# Patient Record
Sex: FEMALE | Race: WHITE | URBAN - METROPOLITAN AREA
[De-identification: names, ages, dates, MRNs, and addresses within clinical notes are randomized per-mention and may not be internally consistent; named-entity substitution may affect disease eponyms.]

---

## 2017-02-09 RX ORDER — ERYTHROMYCIN 5 MG/G
1 OINTMENT OPHTHALMIC 4 TIMES DAILY
Status: ON HOLD | COMMUNITY
End: 2017-02-13

## 2017-02-13 ENCOUNTER — ANESTHESIA (OUTPATIENT)
Dept: SURGERY | Facility: CLINIC | Age: 82
End: 2017-02-13
Payer: MEDICARE

## 2017-02-13 ENCOUNTER — ANESTHESIA EVENT (OUTPATIENT)
Dept: SURGERY | Facility: CLINIC | Age: 82
End: 2017-02-13
Payer: MEDICARE

## 2017-02-13 ENCOUNTER — HOSPITAL ENCOUNTER (OUTPATIENT)
Facility: CLINIC | Age: 82
Discharge: HOME | End: 2017-02-13
Attending: OPHTHALMOLOGY | Admitting: OPHTHALMOLOGY
Payer: MEDICARE

## 2017-02-13 VITALS
SYSTOLIC BLOOD PRESSURE: 144 MMHG | HEART RATE: 73 BPM | TEMPERATURE: 96.3 F | OXYGEN SATURATION: 96 % | RESPIRATION RATE: 16 BRPM | DIASTOLIC BLOOD PRESSURE: 77 MMHG | WEIGHT: 135 LBS | BODY MASS INDEX: 21.69 KG/M2 | HEIGHT: 66 IN

## 2017-02-13 DIAGNOSIS — H40.1122 PRIMARY OPEN ANGLE GLAUCOMA OF LEFT EYE, MODERATE STAGE: Primary | ICD-10-CM

## 2017-02-13 PROCEDURE — 40000170 ZZH STATISTIC PRE-PROCEDURE ASSESSMENT II: Performed by: OPHTHALMOLOGY

## 2017-02-13 PROCEDURE — 37000009 ZZH ANESTHESIA TECHNICAL FEE, EACH ADDTL 15 MIN: Performed by: OPHTHALMOLOGY

## 2017-02-13 PROCEDURE — 25000128 H RX IP 250 OP 636: Performed by: NURSE ANESTHETIST, CERTIFIED REGISTERED

## 2017-02-13 PROCEDURE — 25000125 ZZHC RX 250: Performed by: OPHTHALMOLOGY

## 2017-02-13 PROCEDURE — 25800025 ZZH RX 258: Performed by: ANESTHESIOLOGY

## 2017-02-13 PROCEDURE — 25000125 ZZHC RX 250: Performed by: NURSE ANESTHETIST, CERTIFIED REGISTERED

## 2017-02-13 PROCEDURE — 25800025 ZZH RX 258: Performed by: OPHTHALMOLOGY

## 2017-02-13 PROCEDURE — 37000008 ZZH ANESTHESIA TECHNICAL FEE, 1ST 30 MIN: Performed by: OPHTHALMOLOGY

## 2017-02-13 PROCEDURE — 71000028 ZZH EYE RECOVERY PHASE 2 EACH 15 MINS: Performed by: OPHTHALMOLOGY

## 2017-02-13 PROCEDURE — 25000125 ZZHC RX 250: Performed by: ANESTHESIOLOGY

## 2017-02-13 PROCEDURE — 27210794 ZZH OR GENERAL SUPPLY STERILE: Performed by: OPHTHALMOLOGY

## 2017-02-13 PROCEDURE — 36000105 ZZH EYE SURGERY LEVEL 4 EA 15 ADDTL MIN: Performed by: OPHTHALMOLOGY

## 2017-02-13 PROCEDURE — A9270 NON-COVERED ITEM OR SERVICE: HCPCS | Mod: GY | Performed by: OPHTHALMOLOGY

## 2017-02-13 PROCEDURE — C1783 OCULAR IMP, AQUEOUS DRAIN DE: HCPCS | Performed by: OPHTHALMOLOGY

## 2017-02-13 PROCEDURE — 27210995 ZZH RX 272: Performed by: OPHTHALMOLOGY

## 2017-02-13 PROCEDURE — 25000132 ZZH RX MED GY IP 250 OP 250 PS 637: Mod: GY | Performed by: OPHTHALMOLOGY

## 2017-02-13 PROCEDURE — 36000104 ZZH EYE SURGERY LEVEL 4 1ST 30 MIN: Performed by: OPHTHALMOLOGY

## 2017-02-13 PROCEDURE — V2790 AMNIOTIC MEMBRANE: HCPCS | Performed by: OPHTHALMOLOGY

## 2017-02-13 DEVICE — EYE IMP AMNIOTIC MEMBRANE 2.0X3.0CM AMBIO 5 AF-1230: Type: IMPLANTABLE DEVICE | Site: EYE | Status: FUNCTIONAL

## 2017-02-13 DEVICE — EYE SHUNT EX-PRESS P MINI GLAUCOMA 50UM P-50PL: Type: IMPLANTABLE DEVICE | Site: EYE | Status: FUNCTIONAL

## 2017-02-13 DEVICE — IMP EYE OLOGEN COLLAGEN MATRIX AEON 12MMX1MM 862051: Type: IMPLANTABLE DEVICE | Site: EYE | Status: FUNCTIONAL

## 2017-02-13 RX ORDER — ACETAMINOPHEN 325 MG/1
650 TABLET ORAL ONCE
Status: COMPLETED | OUTPATIENT
Start: 2017-02-13 | End: 2017-02-13

## 2017-02-13 RX ORDER — KETOROLAC TROMETHAMINE 30 MG/ML
INJECTION, SOLUTION INTRAMUSCULAR; INTRAVENOUS PRN
Status: DISCONTINUED | OUTPATIENT
Start: 2017-02-13 | End: 2017-02-13

## 2017-02-13 RX ORDER — SODIUM CHLORIDE, SODIUM LACTATE, POTASSIUM CHLORIDE, CALCIUM CHLORIDE 600; 310; 30; 20 MG/100ML; MG/100ML; MG/100ML; MG/100ML
INJECTION, SOLUTION INTRAVENOUS CONTINUOUS
Status: DISCONTINUED | OUTPATIENT
Start: 2017-02-13 | End: 2017-02-13 | Stop reason: HOSPADM

## 2017-02-13 RX ORDER — ERYTHROMYCIN 5 MG/G
OINTMENT OPHTHALMIC PRN
Status: DISCONTINUED | OUTPATIENT
Start: 2017-02-13 | End: 2017-02-13 | Stop reason: HOSPADM

## 2017-02-13 RX ORDER — ATROPINE SULFATE 10 MG/ML
SOLUTION/ DROPS OPHTHALMIC PRN
Status: DISCONTINUED | OUTPATIENT
Start: 2017-02-13 | End: 2017-02-13 | Stop reason: HOSPADM

## 2017-02-13 RX ORDER — MOXIFLOXACIN 5 MG/ML
1 SOLUTION/ DROPS OPHTHALMIC 4 TIMES DAILY
Qty: 3 ML | Refills: 0 | Status: SHIPPED | OUTPATIENT
Start: 2017-02-13 | End: 2017-02-28

## 2017-02-13 RX ORDER — ONDANSETRON 2 MG/ML
INJECTION INTRAMUSCULAR; INTRAVENOUS PRN
Status: DISCONTINUED | OUTPATIENT
Start: 2017-02-13 | End: 2017-02-13

## 2017-02-13 RX ORDER — DEXAMETHASONE SODIUM PHOSPHATE 10 MG/ML
INJECTION, SOLUTION INTRAMUSCULAR; INTRAVENOUS PRN
Status: DISCONTINUED | OUTPATIENT
Start: 2017-02-13 | End: 2017-02-13 | Stop reason: HOSPADM

## 2017-02-13 RX ORDER — SODIUM CHLORIDE, SODIUM LACTATE, POTASSIUM CHLORIDE, CALCIUM CHLORIDE 600; 310; 30; 20 MG/100ML; MG/100ML; MG/100ML; MG/100ML
1000 INJECTION, SOLUTION INTRAVENOUS CONTINUOUS
Status: DISCONTINUED | OUTPATIENT
Start: 2017-02-13 | End: 2017-02-13 | Stop reason: HOSPADM

## 2017-02-13 RX ORDER — FENTANYL CITRATE 50 UG/ML
INJECTION, SOLUTION INTRAMUSCULAR; INTRAVENOUS PRN
Status: DISCONTINUED | OUTPATIENT
Start: 2017-02-13 | End: 2017-02-13

## 2017-02-13 RX ORDER — PREDNISOLONE ACETATE 10 MG/ML
1 SUSPENSION/ DROPS OPHTHALMIC 4 TIMES DAILY
Qty: 6 ML | Refills: 1 | Status: SHIPPED | OUTPATIENT
Start: 2017-02-13 | End: 2017-04-14

## 2017-02-13 RX ORDER — BALANCED SALT SOLUTION 6.4; .75; .48; .3; 3.9; 1.7 MG/ML; MG/ML; MG/ML; MG/ML; MG/ML; MG/ML
SOLUTION OPHTHALMIC PRN
Status: DISCONTINUED | OUTPATIENT
Start: 2017-02-13 | End: 2017-02-13 | Stop reason: HOSPADM

## 2017-02-13 RX ADMIN — DEXMEDETOMIDINE 10 MCG: 100 INJECTION, SOLUTION, CONCENTRATE INTRAVENOUS at 14:07

## 2017-02-13 RX ADMIN — LIDOCAINE HYDROCHLORIDE 0.3 ML: 10 INJECTION, SOLUTION EPIDURAL; INFILTRATION; INTRACAUDAL; PERINEURAL at 12:24

## 2017-02-13 RX ADMIN — Medication: at 13:23

## 2017-02-13 RX ADMIN — KETOROLAC TROMETHAMINE 15 MG: 30 INJECTION, SOLUTION INTRAMUSCULAR at 15:02

## 2017-02-13 RX ADMIN — DEXMEDETOMIDINE 10 MCG: 100 INJECTION, SOLUTION, CONCENTRATE INTRAVENOUS at 14:16

## 2017-02-13 RX ADMIN — FENTANYL CITRATE 50 MCG: 50 INJECTION, SOLUTION INTRAMUSCULAR; INTRAVENOUS at 14:05

## 2017-02-13 RX ADMIN — ONDANSETRON 4 MG: 2 INJECTION INTRAMUSCULAR; INTRAVENOUS at 14:15

## 2017-02-13 RX ADMIN — ACETAMINOPHEN 650 MG: 325 TABLET ORAL at 15:15

## 2017-02-13 RX ADMIN — SODIUM CHLORIDE, POTASSIUM CHLORIDE, SODIUM LACTATE AND CALCIUM CHLORIDE: 600; 310; 30; 20 INJECTION, SOLUTION INTRAVENOUS at 12:24

## 2017-02-13 RX ADMIN — KETOROLAC TROMETHAMINE 15 MG: 30 INJECTION, SOLUTION INTRAMUSCULAR at 14:15

## 2017-02-13 RX ADMIN — FENTANYL CITRATE 50 MCG: 50 INJECTION, SOLUTION INTRAMUSCULAR; INTRAVENOUS at 14:15

## 2017-02-13 ASSESSMENT — ENCOUNTER SYMPTOMS: ORTHOPNEA: 0

## 2017-02-13 NOTE — OP NOTE
Patient:      Margaux Villasenor                                               Reg No:     [id number]                                               Date:          2017                                               :          1931                                                  Attending:   GABRIELLE GEIGER M.D.                                                Surgeon:     GABRIELLE GEIGER M.D.                                                Dictating:    GABRIELLE GEIGER M.D.                                                Service Dt: Ophthalmology                                      OPERATIVE REPORT          ANESTHESIA:   Monitored anesthetic care with local and subTenon's block of mixture of two mls of lidocaine 2% and two mls of Marcaine 0.75% injected into inferior-nasal subTenon s space.      PREOPERATIVE DIAGNOSIS (ES):   OAG, left eye     POSTOPERATIVE DIAGNOSIS (ES):   The same     NAME OF OPERATION:   1. Express shunt, model P-50, serial number [id number], with Ologen and amniotic membrane, Ambio 5, [id number], left eye     COMPLICATIONS:  None.      SPECIMENS REMOVED:   None.     INDICATIONS FOR PROCEDURE:  The patient is a 85 year old  female with unsatisfactory controlled intraocular pressure on maximum medical treatment and progressive visual field loss. The benefits, alternatives and risks of the procedure including the risk of infection, inflammation, bleeding, blindness, need for another procedure or additional glaucoma medications, hypotony, elevated intraocular pressure were discussed with the patient. The patient understood the benefits, alternatives and risks associated with the procedure and has elected to proceed with the surgery.     DESCRIPTION OF PROCEDURE:     After the appropriate pre-operative consent was obtained the patient was taken to the operating room and hooked up to cardiorespiratory monitoring by the anesthesia team.   Monitored anesthetic care was provided by the anesthesia team. The time out was taken to identify the patient, the surgery, the implant and the eye. The patient was draped in usual sterile ophthalmic fashion. Tetracaine drops were instilled into the eye. Lid speculum was placed into the eye. SubTenon injection with a mixture of lidocaine and Marcaine was given into inferior-nasal subtenon s space.      6-0 silk traction suture was placed into superior cornea and the eye was infraducted. The 6-0 silk suture was secured to the sterile drape with hemostat. Conjunctival peritomy was performed utilizing straight smooth forceps and Trevon's scissors 10 millimeter superior to the limbus. The conjunctiva and Tenon s were dissected towards limbus. Cautery was used to archive hemostasis.  [place] blade was used to create a partial-thickness scleral flap at the limbus. Nasal paracentesis was performed with 25 gauge needle on a TB syringe. The AC was filled with healon. 25 gauge needle on a TB syringe was used to create an entry for the Ex-PRESS shunt in to the AC. The Ex-PRESS shunt was inserted into the AC. The flap was re-positioned and secured to the sclera with 4 10-0 nylon sutures, one at each corner. The AC was re-filled with healon GV. Ologen was placed on top of the scleral flap. Amnio was placed on top of the ologen to prevent tissue thinning and bleb leak. Conjunctiva and Tenon s were closed with 8-0 vycryl. Fluorescein strip wetted with balanced salt solution was used to paint conjunctival incision to identify any leak. Following that fluorescein was washed off with balanced salt solution.   At the end of the surgery the AC was deep, the bleb was forming and no leak was identified. Vancomycin and dexamethazone injections were given in sub-Tenon's space. The lid speculum was removed. Maxitrol ointment waw placed in the eye. The eye was patched and shielded in standard ophthalmic fashion.     DISPOSITION: The  patient was taken to the recovery room in stable condition having tolerated the procedure well. He will be given post-operative instructions and seen in the eye clinic tomorrow.    SPONGE/INSTRUMENT/NEEDLE COUNTS:   All sponge and needle counts were correct at the end of the case.     ____________________________   Taylor GEIGER M.D.

## 2017-02-13 NOTE — IP AVS SNAPSHOT
MRN:[id number]                      After Visit Summary   2/13/2017    Margaux Villasenor    MRN: [id number]           Thank you!     Thank you for choosing [facility] for your care. Our goal is always to provide you with excellent care. Hearing back from our patients is one way we can continue to improve our services. Please take a few minutes to complete the written survey that you may receive in the mail after you visit with us. Thank you!        Patient Information     Date Of Birth          7/2/1931        About your hospital stay     You were admitted on:  February 13, 2017 You last received care in the:  [facility]    You were discharged on:  February 13, 2017       Who to Call     For medical emergencies, please call 911.  For non-urgent questions about your medical care, please call your primary care provider or clinic, [phone]  For questions related to your surgery, please call your surgery clinic        Attending Provider     Provider Taylor Garcia MD Ophthalmology       Primary Care Provider Office Phone # Fax #    Melinda Jackson [phone]       TIARA MANNING [address]  [facility] [address]        After Care Instructions     Eye drops as prescribed by physician.  Start drops today unless told otherwise by the physician           May use Tylenol or Advil for pain as directed by the physician           Notify Physician if you have severe headache or nausea           Remove patch per physician instruction           Return to clinic as instructed by physician           Wear eye shield or patch as directed                 Further instructions from your care team       [facility] Discharge  Instructions  Anesthesia (Eye Care Center)   Adult Discharge Instructions    For 24 hours after surgery    1. Get plenty of rest.  Make arrangements to have a responsible adult stay with you  for at least 6 hours after you leave the hospital.  2. Do not drive or use heavy equipment for 24 hours.    3. Do not drink alcohol for 24 hours.  4. Do not sign legal documents or make important decisions for 24 hours.  5. Avoid strenuous or risky activities. You may feel lightheaded.  If so, sit for a few minutes before standing.  Have someone help you get up.   6. Conscious sedation patients may resume a regular diet..  7. Any questions of medical nature, call your physician.        Discharge Instructions  Post-Operative Glaucoma Surgery  Dr. Chan  [phone]    Pain Management:      Some mild discomfort is normal following surgery.  If you are currently taking any medications for pain, you may continue to take what you normally do.  Otherwise, you may take an over-the-counter medication such as Tylenol (acetaminophen) or ibuprofen (Advil) for relief.  Take as instructed on the bottle.    If you are experiencing uncontrollable pain or have other concerns, call [phone].    Other Medications:    No eye drops to the operative eye tonight.  Please remember to bring all your drops/supplies with you to your post-op appointment.  You will be instructed on the drops at that time.    Do not take glaucoma pills.    You may resume your regular home medications, including any drops you are using in your NON-operative eye.      General Rules:    Avoid strenuous activity. Do not do anything that would cause you to strain or make your face red. Open your mouth if you cough, use a laxative if necessary, do not lift greater than 5 pounds.    Keep your head above your heart.  Do not bend down lower than waist level.  Bend with your knees.    Try to sleep in a recliner chair tonight OR on a least two pillows. DO NOT LAY FLAT.    Keep the bandage over your eye.  The doctor will remove the bandage at your appointment at the clinic.    Try to write down any questions you may have to bring to your post-op appointment.    Be  "restful today.    Pending Results     No orders found from 2017 to 2017.            Admission Information     Date & Time Provider Department Dept. Phone    2017 Taylor Chan MD [facility] [phone]      Your Vitals Were     Blood Pressure Pulse Temperature Respirations Height Weight    168/80 73 96.3  F (35.7  C) (Temporal) 16 1.676 m (5' 6\") 61.2 kg (135 lb)    Pulse Oximetry BMI (Body Mass Index)                94% 21.79 kg/m2          MyChart Information     [employer] lets you send messages to your doctor, view your test results, renew your prescriptions, schedule appointments and more. To sign up, go to [email or web address] . Click on \"Log in\" on the left side of the screen, which will take you to the Welcome page. Then click on \"Sign up Now\" on the right side of the page.     You will be asked to enter the access code listed below, as well as some personal information. Please follow the directions to create your username and password.     Your access code is: [email or web address]  Expires: 2017  3:03 PM     Your access code will  in 90 days. If you need help or a new code, please call your [facility] clinic or [phone].        Care EveryWhere ID     This is your Care EveryWhere ID. This could be used by other organizations to access your [facility] medical records  [id number]           Review of your medicines      UNREVIEWED medicines. Ask your doctor about these medicines        Dose / Directions    ALIGN PO        Take by mouth daily   Refills:  0       B COMPLEX PO        Dose:  1 tablet   Take 1 tablet by mouth daily.   Refills:  0       CALCIUM 600 PO        Dose:  600 tablet   Take 600 tablets by mouth daily Take 600 mg every am, 300 mg at lunch, 600 mg in pm   Refills:  0       cholestyramine 4 G Packet   Commonly known as:  QUESTRAN        Dose:  1-2 packet   Take 1-2 packets by mouth once   Refills:  0       conjugated estrogens cream   Commonly " known as:  PREMARIN        Place vaginally At Bedtime   Refills:  0       COREG PO        Dose:  12.5 mg   Take 12.5 mg by mouth 2 times daily (with meals) Hold am dose for SBP <120   Refills:  0       ECOTRIN PO        Dose:  81 mg   Take 81 mg by mouth One every other day   Refills:  0       hyoscyamine 0.125 MG sublingual tablet   Commonly known as:  LEVSIN/SL        Dose:  0.125 mg   Place 0.125 mg under the tongue every 4 hours as needed for cramping   Refills:  0       OMEPRAZOLE PO        Dose:  20 mg   Take 20 mg by mouth every morning   Refills:  0       ZIOPTAN 0.0015 % Soln   Generic drug:  Tafluprost        Dose:  1 drop   Place 1 drop Into the left eye At Bedtime   Refills:  0       zolpidem 5 MG tablet   Commonly known as:  AMBIEN        Dose:  5 mg   Take 5 mg by mouth nightly as needed.   Refills:  0         START taking        Dose / Directions    moxifloxacin 0.5 % ophthalmic solution   Commonly known as:  VIGAMOX   Used for:  Primary open angle glaucoma of left eye, moderate stage        Dose:  1 drop   Place 1 drop Into the left eye 4 times daily for 15 days   Quantity:  3 mL   Refills:  0       prednisoLONE acetate 1 % ophthalmic susp   Commonly known as:  PRED FORTE   Used for:  Primary open angle glaucoma of left eye, moderate stage        Dose:  1 drop   Place 1 drop Into the left eye 4 times daily   Quantity:  6 mL   Refills:  1            Where to get your medicines      These medications were sent to [facility] [place], [place] - [address]  [address] [facility] [address], [place] [address]     Phone:  [phone]     moxifloxacin 0.5 % ophthalmic solution    prednisoLONE acetate 1 % ophthalmic susp                Protect others around you: Learn how to safely use, store and throw away your medicines at www.disposemymeds.org.             Medication List: This is a list of all your medications and when to take them. Check marks below indicate your daily home schedule. Keep this  list as a reference.      Medications           Morning Afternoon Evening Bedtime As Needed    ALIGN PO   Take by mouth daily                                B COMPLEX PO   Take 1 tablet by mouth daily.                                CALCIUM 600 PO   Take 600 tablets by mouth daily Take 600 mg every am, 300 mg at lunch, 600 mg in pm                                cholestyramine 4 G Packet   Commonly known as:  QUESTRAN   Take 1-2 packets by mouth once                                conjugated estrogens cream   Commonly known as:  PREMARIN   Place vaginally At Bedtime                                COREG PO   Take 12.5 mg by mouth 2 times daily (with meals) Hold am dose for SBP <120                                ECOTRIN PO   Take 81 mg by mouth One every other day                                hyoscyamine 0.125 MG sublingual tablet   Commonly known as:  LEVSIN/SL   Place 0.125 mg under the tongue every 4 hours as needed for cramping                                moxifloxacin 0.5 % ophthalmic solution   Commonly known as:  VIGAMOX   Place 1 drop Into the left eye 4 times daily for 15 days                                OMEPRAZOLE PO   Take 20 mg by mouth every morning                                prednisoLONE acetate 1 % ophthalmic susp   Commonly known as:  PRED FORTE   Place 1 drop Into the left eye 4 times daily                                ZIOPTAN 0.0015 % Soln   Place 1 drop Into the left eye At Bedtime   Generic drug:  Tafluprost                                zolpidem 5 MG tablet   Commonly known as:  AMBIEN   Take 5 mg by mouth nightly as needed.

## 2017-02-13 NOTE — DISCHARGE INSTRUCTIONS
[facility] Discharge  Instructions  Anesthesia (Eye Care Center)   Adult Discharge Instructions    For 24 hours after surgery    1. Get plenty of rest.  Make arrangements to have a responsible adult stay with you for at least 6 hours after you leave the hospital.  2. Do not drive or use heavy equipment for 24 hours.    3. Do not drink alcohol for 24 hours.  4. Do not sign legal documents or make important decisions for 24 hours.  5. Avoid strenuous or risky activities. You may feel lightheaded.  If so, sit for a few minutes before standing.  Have someone help you get up.   6. Conscious sedation patients may resume a regular diet..  7. Any questions of medical nature, call your physician.        Discharge Instructions  Post-Operative Glaucoma Surgery  Dr. Chan  [phone]    Pain Management:      Some mild discomfort is normal following surgery.  If you are currently taking any medications for pain, you may continue to take what you normally do.  Otherwise, you may take an over-the-counter medication such as Tylenol (acetaminophen) or ibuprofen (Advil) for relief.  Take as instructed on the bottle.    If you are experiencing uncontrollable pain or have other concerns, call [phone].    Other Medications:    No eye drops to the operative eye tonight.  Please remember to bring all your drops/supplies with you to your post-op appointment.  You will be instructed on the drops at that time.    Do not take glaucoma pills.    You may resume your regular home medications, including any drops you are using in your NON-operative eye.      General Rules:    Avoid strenuous activity. Do not do anything that would cause you to strain or make your face red. Open your mouth if you cough, use a laxative if necessary, do not lift greater than 5 pounds.    Keep your head above your heart.  Do not bend down lower than waist level.  Bend with your knees.    Try to sleep in a recliner chair  tonight OR on a least two pillows. DO NOT LAY FLAT.    Keep the bandage over your eye.  The doctor will remove the bandage at your appointment at the clinic.    Try to write down any questions you may have to bring to your post-op appointment.    Be restful today.

## 2017-02-13 NOTE — ANESTHESIA PREPROCEDURE EVALUATION
Anesthesia Evaluation     . Pt has had prior anesthetic.     No history of anesthetic complications     ROS/MED HX    ENT/Pulmonary: Comment: TMJ synd     (-) sleep apnea   Neurologic: Comment: CLBP      Cardiovascular: Comment: PVCs, ischemic CM, EF 65%, kori stress test 2014    (+) Dyslipidemia, hypertension--CAD, --stent,2006  . : . CHF . . :. valvular problems/murmurs type: MR mild:. Previous cardiac testing date:results:Stress Testdate:2/2014 results:Normal per H&P. I do not have records date: results: date: results:         (-) angina, VALDES, orthopnea/PND and angina   METS/Exercise Tolerance:     Hematologic:         Musculoskeletal:   (+) arthritis, , , -       GI/Hepatic: Comment: Colitis, IBS    (+) GERD Asymptomatic on medication,       Renal/Genitourinary: Comment: Hyponatremia chronic        Endo:         Psychiatric:     (+) psychiatric history anxiety and depression      Infectious Disease:         Malignancy:         Other: Comment: glaucoma              Physical Exam  Normal systems: cardiovascular and pulmonary    Airway   Mallampati: III  TM distance: <3 FB  Neck ROM: full    Dental   (+) caps    Cardiovascular       Pulmonary                         Anesthesia Plan      History & Physical Review  History and physical reviewed and following examination; no interval change.    ASA Status:  3 .        Plan for MAC Reason for MAC:  Procedure to face, neck, head or breast  PONV prophylaxis:  Ondansetron (or other 5HT-3)       Postoperative Care      Consents  Anesthetic plan, risks, benefits and alternatives discussed with:  Patient..

## 2017-02-13 NOTE — IP AVS SNAPSHOT
[facility]    [address]    [place] [address]    Phone:  [phone]    Fax:  [phone]                                       After Visit Summary   2/13/2017    Margaux Villasenor    MRN: [id number]           After Visit Summary Signature Page     I have received my discharge instructions, and my questions have been answered. I have discussed any challenges I see with this plan with the nurse or doctor.    ..........................................................................................................................................  Patient/Patient Representative Signature      ..........................................................................................................................................  Patient Representative Print Name and Relationship to Patient    ..................................................               ................................................  Date                                            Time    ..........................................................................................................................................  Reviewed by Signature/Title    ...................................................              ..............................................  Date                                                            Time

## 2017-02-13 NOTE — ANESTHESIA CARE TRANSFER NOTE
Patient: Margaux Villasenor    Procedure(s):  LEFT EYE INSERTION OF EXPRESS SHUNT WITH OLOGEN AND, AMNIOTIC MEMBRANE - Wound Class: I-Clean    Diagnosis: LEFT EYE GLUACOMA  Diagnosis Additional Information: No value filed.    Anesthesia Type:   MAC     Note:  Airway :Room Air  Patient transferred to:PACU  Comments: VSS. Airway and IV patent. Patient comfortable. Report to RN. Stable care transfer.      Vitals: (Last set prior to Anesthesia Care Transfer)    CRNA VITALS  2/13/2017 1429 - 2/13/2017 1502      2/13/2017             Resp Rate (set): 10                Electronically Signed By: LANIE Armando CRNA  February 13, 2017  3:02 PM

## 2017-02-14 NOTE — ANESTHESIA POSTPROCEDURE EVALUATION
Patient: Margaux Villasenor    Procedure(s):  LEFT EYE INSERTION OF EXPRESS SHUNT WITH OLOGEN AND, AMNIOTIC MEMBRANE - Wound Class: I-Clean    Diagnosis:LEFT EYE GLUACOMA  Diagnosis Additional Information: No value filed.    Anesthesia Type:  MAC    Note:  Anesthesia Post Evaluation    Patient location during evaluation: PACU  Patient participation: Able to fully participate in evaluation  Level of consciousness: awake  Pain management: adequate  Airway patency: patent  Cardiovascular status: acceptable  Respiratory status: acceptable  Hydration status: acceptable  PONV: none     Anesthetic complications: None          Last vitals:  Vitals:    02/13/17 1501 02/13/17 1504 02/13/17 1518   BP:  148/72 144/77   Pulse:      Resp: 16  16   Temp:      SpO2: 94%  96%         Electronically Signed By: Terrence Sagastume MD  February 13, 2017  6:35 PM

## 2025-07-11 ENCOUNTER — LAB REQUISITION (OUTPATIENT)
Dept: LAB | Facility: CLINIC | Age: OVER 89
End: 2025-07-11
Payer: MEDICARE

## 2025-07-11 DIAGNOSIS — N39.0 URINARY TRACT INFECTION, SITE NOT SPECIFIED: ICD-10-CM

## 2025-07-11 LAB
ALBUMIN UR-MCNC: NEGATIVE MG/DL
APPEARANCE UR: CLEAR
BILIRUB UR QL STRIP: NEGATIVE
COLOR UR AUTO: YELLOW
GLUCOSE UR STRIP-MCNC: NEGATIVE MG/DL
HGB UR QL STRIP: NEGATIVE
KETONES UR STRIP-MCNC: NEGATIVE MG/DL
LEUKOCYTE ESTERASE UR QL STRIP: ABNORMAL
MUCOUS THREADS #/AREA URNS LPF: PRESENT /LPF
NITRATE UR QL: NEGATIVE
PH UR STRIP: 6 [PH] (ref 5–7)
RBC URINE: <1 /HPF
SP GR UR STRIP: 1.02 (ref 1–1.03)
SQUAMOUS EPITHELIAL: 2 /HPF
TRANSITIONAL EPI: <1 /HPF
UROBILINOGEN UR STRIP-MCNC: NORMAL MG/DL
WBC URINE: 4 /HPF

## 2025-07-11 PROCEDURE — 81001 URINALYSIS AUTO W/SCOPE: CPT | Mod: ORL | Performed by: NURSE PRACTITIONER

## (undated) DEVICE — GLOVE PROTEXIS MICRO 7.0  2D73PM70

## (undated) DEVICE — EYE SHIELD PLASTIC

## (undated) DEVICE — PACK CATARACT CUSTOM SO DALE SEY32CTFCX

## (undated) DEVICE — SU SILK 6-0 G-1DA 18" 780G

## (undated) DEVICE — SU ETHILON 10-0 CS160-6 12" 9000G

## (undated) DEVICE — SYR 10ML LL W/O NDL

## (undated) DEVICE — EYE KNIFE CRESCENT 55DEG 373807

## (undated) DEVICE — NDL 30GA 0.5" 305106

## (undated) DEVICE — SU VICRYL 8-0 BV130-5 5" J401G

## (undated) DEVICE — EYE DRSG PAD OVAL

## (undated) DEVICE — LINEN TOWEL PACK X5 5464

## (undated) DEVICE — BLADE KNIFE BEAVER MICROSHARP GREEN 377515

## (undated) DEVICE — BLADE KNIFE BEAVER 376700

## (undated) DEVICE — NDL 25GA 1.5" 305127

## (undated) DEVICE — EYE SPONGE SPEAR WECK CEL 0008685

## (undated) RX ORDER — BALANCED SALT SOLUTION 6.4; .75; .48; .3; 3.9; 1.7 MG/ML; MG/ML; MG/ML; MG/ML; MG/ML; MG/ML
SOLUTION OPHTHALMIC
Status: DISPENSED
Start: 2017-02-13

## (undated) RX ORDER — KETOROLAC TROMETHAMINE 30 MG/ML
INJECTION, SOLUTION INTRAMUSCULAR; INTRAVENOUS
Status: DISPENSED
Start: 2017-02-13

## (undated) RX ORDER — ONDANSETRON 2 MG/ML
INJECTION INTRAMUSCULAR; INTRAVENOUS
Status: DISPENSED
Start: 2017-02-13

## (undated) RX ORDER — DEXAMETHASONE SODIUM PHOSPHATE 10 MG/ML
INJECTION, SOLUTION INTRAMUSCULAR; INTRAVENOUS
Status: DISPENSED
Start: 2017-02-13

## (undated) RX ORDER — FENTANYL CITRATE 50 UG/ML
INJECTION, SOLUTION INTRAMUSCULAR; INTRAVENOUS
Status: DISPENSED
Start: 2017-02-13

## (undated) RX ORDER — ACETAMINOPHEN 325 MG/1
TABLET ORAL
Status: DISPENSED
Start: 2017-02-13

## (undated) RX ORDER — ERYTHROMYCIN 5 MG/G
OINTMENT OPHTHALMIC
Status: DISPENSED
Start: 2017-02-13

## (undated) RX ORDER — ATROPINE SULFATE 10 MG/ML
SOLUTION/ DROPS OPHTHALMIC
Status: DISPENSED
Start: 2017-02-13

## (undated) RX ORDER — CEFTAZIDIME 1 G/1
INJECTION, POWDER, FOR SOLUTION INTRAMUSCULAR; INTRAVENOUS
Status: DISPENSED
Start: 2017-02-13

## (undated) RX ORDER — NEOMYCIN SULFATE, POLYMYXIN B SULFATE, AND DEXAMETHASONE 3.5; 10000; 1 MG/G; [USP'U]/G; MG/G
OINTMENT OPHTHALMIC
Status: DISPENSED
Start: 2017-02-13